# Patient Record
Sex: MALE | Race: OTHER
[De-identification: names, ages, dates, MRNs, and addresses within clinical notes are randomized per-mention and may not be internally consistent; named-entity substitution may affect disease eponyms.]

---

## 2019-03-03 ENCOUNTER — HOSPITAL ENCOUNTER (EMERGENCY)
Dept: HOSPITAL 41 - JD.ED | Age: 29
Discharge: HOME | End: 2019-03-03
Payer: COMMERCIAL

## 2019-03-03 DIAGNOSIS — J10.1: Primary | ICD-10-CM

## 2019-03-03 NOTE — EDM.PDOC
ED HPI GENERAL MEDICAL PROBLEM





- General


Chief Complaint: Respiratory Problem


Stated Complaint: SINUS INFECTION/HURT TOP BREATH AND CHEST PAIN


Time Seen by Provider: 03/03/19 18:14


Source of Information: Reports: Patient


History Limitations: Reports: No Limitations





- History of Present Illness


INITIAL COMMENTS - FREE TEXT/NARRATIVE: 





29 yo M comes in for congestion, cough and flu-like symptoms x 3 days. He did 

not have the flu shot this year. No h/o asthma, TB or previous sinus 

infections. He denies any sick contacts, recent travel, TB contacts, but does 

state he could have "picked up something" from someone at work. He c/o F/C, SOB

, wheezing, Cough with dark green sputum tinged w/ blood, congestion, runny 

nose with dark green mucus tinged w/ blood, weakness, body aches, decreased 

appetite, difficulty sleeping d/t SOB. He denies N/V/D, chest pain, hematuria/

hematochezia/melena or other GI/ complaints. 





No current PCP.





- Related Data


 Allergies











Allergy/AdvReac Type Severity Reaction Status Date / Time


 


No Known Allergies Allergy   Verified 03/03/19 17:47











Home Meds: 


 Home Meds





. [No Known Home Meds]  03/03/19 [History]











Past Medical History





- Past Health History


Medical/Surgical History: Denies Medical/Surgical History


HEENT History: Reports: Other (See Below)


Other HEENT History: Nasal polyp removal





Social & Family History





- Tobacco Use


Smoking Status *Q: Never Smoker





- Recreational Drug Use


Recreational Drug Use: No





ED ROS GENERAL





- Review of Systems


Review Of Systems: See Below


Constitutional: Reports: No Symptoms, Fever, Chills, Weakness, Decreased 

Appetite


HEENT: Reports: Sinus Problem, Throat Pain.  Denies: Ear Pain, Eye Discharge, 

Vision Change


Respiratory: Reports: Shortness of Breath, Wheezing, Cough, Sputum (dark green w

/ blood tinged sputum)


Cardiovascular: Reports: No Symptoms.  Denies: Chest Pain, Blood Pressure 

Problem


Endocrine: Reports: Fatigue


GI/Abdominal: Reports: Decreased Appetite.  Denies: Abdominal Pain, Bloody Stool

, Diarrhea, Nausea, Vomiting


: Reports: No Symptoms.  Denies: Dysuria, Flank Pain, Frequency, Hematuria


Musculoskeletal: Reports: No Symptoms


Skin: Reports: No Symptoms


Neurological: Reports: Weakness.  Denies: Headache


Psychiatric: Reports: No Symptoms


Hematologic/Lymphatic: Reports: No Symptoms


Immunologic: Reports: No Symptoms





ED EXAM, GENERAL





- Physical Exam


Exam: See Below


Exam Limited By: No Limitations


General Appearance: Alert, WD/WN


Eye Exam: Bilateral Eye: EOMI, PERRL


Ears: Normal External Exam, Normal Canal, Hearing Grossly Normal


Nose: Normal Inspection, Normal Mucosa, No Blood, Nasal Tenderness


Throat/Mouth: Normal Lips, Normal Teeth, Normal Gums, Normal Oropharynx, Normal 

Voice, No Airway Compromise.  No: Normal Inspection (erythema)


Neck: Lymphadenopathy (L), Lymphadenopathy (R)


Respiratory/Chest: No Respiratory Distress, Lungs Clear, Normal Breath Sounds, 

No Accessory Muscle Use, Chest Non-Tender


Cardiovascular: Normal Peripheral Pulses, Regular Rate, Rhythm, No Edema, No 

Gallop, No JVD, No Murmur, No Rub


Peripheral Pulses: 4+: Posterior Tibial (L), Posterior Tibial (R), Dorsalis 

Pedis (L), Dorsalis Pedis (R)


GI/Abdominal: Normal Bowel Sounds, Soft, Non-Tender, No Organomegaly, No 

Distention, No Abnormal Bruit, No Mass


Back Exam: Normal Inspection


Extremities: Normal Inspection, Normal Range of Motion, Non-Tender, Normal 

Capillary Refill, No Pedal Edema


Neurological: Alert, Oriented, CN II-XII Intact, Normal Cognition, Normal Gait, 

Normal Reflexes, No Motor/Sensory Deficits


Psychiatric: Normal Affect, Normal Mood


Skin Exam: Warm, Dry, Intact, Normal Color, No Rash





Course





- Vital Signs


Last Recorded V/S: 


 Last Vital Signs











Temp  97.6 F   03/03/19 17:48


 


Pulse  64   03/03/19 17:48


 


Resp  16   03/03/19 17:48


 


BP  137/81   03/03/19 17:48


 


Pulse Ox  97   03/03/19 17:48














- Orders/Labs/Meds


Orders: 


 Active Orders 24 hr











 Category Date Time Status


 


 Chest 1V Frontal [CR] Stat Exams  03/03/19 18:33 Taken


 


 COMPREHENSIVE METABOLIC PN,CMP [CHEM] Stat Lab  03/03/19 18:50 Received


 


 CRP [C-REACTIVE PROTEIN] [CHEM] Stat Lab  03/03/19 18:50 Received


 


 CULTURE STREP A CONFIRMATION [RM] Stat Lab  03/03/19 18:35 Results


 


 STREP SCRN A RAPID W CULT CONF [RM] Stat Lab  03/03/19 18:35 Results











Labs: 


 Laboratory Tests











  03/03/19 Range/Units





  18:50 


 


WBC  3.55 L  (4.23-9.07)  K/mm3


 


RBC  4.82  (4.63-6.08)  M/mm3


 


Hgb  14.4  (13.7-17.5)  gm/L


 


Hct  40.8  (40.1-51.0)  %


 


MCV  84.6  (79.0-92.2)  fl


 


MCH  29.9  (25.7-32.2)  pg


 


MCHC  35.3  (32.2-35.5)  g/dl


 


RDW Std Deviation  37.5  (35.1-43.9)  fL


 


Plt Count  229  (163-337)  K/mm3


 


MPV  9.1 L  (9.4-12.3)  fl


 


Neut % (Auto)  27.9 L  (34.0-67.9)  %


 


Lymph % (Auto)  54.9 H  (21.8-53.1)  %


 


Mono % (Auto)  15.2 H  (5.3-12.2)  %


 


Eos % (Auto)  1.1  (0.8-7.0)  


 


Baso % (Auto)  0.6  (0.1-1.2)  %


 


Neut # (Auto)  0.99 L  (1.78-5.38)  K/mm3


 


Lymph # (Auto)  1.95  (1.32-3.57)  K/mm3


 


Mono # (Auto)  0.54  (0.30-0.82)  K/mm3


 


Eos # (Auto)  0.04  (0.04-0.54)  K/mm3


 


Baso # (Auto)  0.02  (0.01-0.08)  K/mm3


 


Manual Slide Review  Normal smear  














- Re-Assessments/Exams


Free Text/Narrative Re-Assessment/Exam: 





03/03/19 18:33


I ordered Strep, Influenza, CBC, CMP, CRP, CXR








03/03/19 19:16


Strep negative, Influenza B positive





CBC WNL


CMP, CRP, CXR pending





03/03/19 19:30


CXR reviewed by Dr. Marc and myself. No pneumonia present, perhaps some 

bronchitis likely 2/2 Influenza, nothing else acute appreciated.





03/03/19 19:41


CMP WNL, CRP elevated at 5.6, likely 2/2 Influenza/bronchitis





Departure





- Departure


Time of Disposition: 19:44


Disposition: Home, Self-Care 01


Condition: Fair


Clinical Impression: 


 Influenza B, Bronchitis with influenza








- Discharge Information


*PRESCRIPTION DRUG MONITORING PROGRAM REVIEWED*: Not Applicable


*COPY OF PRESCRIPTION DRUG MONITORING REPORT IN PATIENT [place]: Not Applicable


Instructions:  Influenza, Adult, Easy-to-Read, Viral Respiratory Infection, Easy

-To-Read, Cough, Adult, Easy-to-Read


Referrals: 


PCP,None [Primary Care Provider] - 


Forms:  ED Department Discharge


Additional Instructions: 


You were seen in the ED today for Flu-like symptoms, including congestion and 

cough x3-4 days. You tested positive for Influenza B. Strep was negative. Other 

workup here was negative. CXR did show possible bronchitis, likely 2/2 

influenza. At this time, Tamiflu is not indicated as it has already been >48H 

since your symptoms started. Recommend over-the-counter decongestant such as 

Sudafed for congestion, humidifier/hot shower to help break up the mucous in 

lungs and nose, and other over the counter medications for relief of other 

symptoms (such as Mucinex for mucous, Nyquil/Dayquil for cough, etc.). Cough 

will likely linger for up to 10 days. Recommend plenty of rest and fluids/

hydration with Gatorade, water, and/or Pedialyte. Follow up with primary care 

physician as needed. Please return to ED if new or worsening symptoms.





- My Orders


Last 24 Hours: 


My Active Orders





03/03/19 18:33


Chest 1V Frontal [CR] Stat 





03/03/19 18:35


CULTURE STREP A CONFIRMATION [RM] Stat 


STREP SCRN A RAPID W CULT CONF [RM] Stat 





03/03/19 18:50


COMPREHENSIVE METABOLIC PN,CMP [CHEM] Stat 


CRP [C-REACTIVE PROTEIN] [CHEM] Stat 














- Assessment/Plan


Last 24 Hours: 


My Active Orders





03/03/19 18:33


Chest 1V Frontal [CR] Stat 





03/03/19 18:35


CULTURE STREP A CONFIRMATION [RM] Stat 


STREP SCRN A RAPID W CULT CONF [RM] Stat 





03/03/19 18:50


COMPREHENSIVE METABOLIC PN,CMP [CHEM] Stat 


CRP [C-REACTIVE PROTEIN] [CHEM] Stat

## 2019-03-04 NOTE — CR
Chest: Portable view of the chest was obtained.

 

Comparison: No prior chest x-ray.

 

Heart size and mediastinum are normal.  Lungs are clear.  Bony 

structures are grossly intact.

 

Impression:

1.  Nothing acute is identified on portable chest x-ray.

 

Diagnostic code #1